# Patient Record
Sex: FEMALE | HISPANIC OR LATINO | URBAN - METROPOLITAN AREA
[De-identification: names, ages, dates, MRNs, and addresses within clinical notes are randomized per-mention and may not be internally consistent; named-entity substitution may affect disease eponyms.]

---

## 2018-04-24 ENCOUNTER — OFFICE (OUTPATIENT)
Dept: URBAN - METROPOLITAN AREA CLINIC 33 | Facility: CLINIC | Age: 63
End: 2018-04-24

## 2018-04-24 VITALS
DIASTOLIC BLOOD PRESSURE: 99 MMHG | SYSTOLIC BLOOD PRESSURE: 136 MMHG | HEIGHT: 66 IN | WEIGHT: 161 LBS | HEART RATE: 78 BPM | TEMPERATURE: 97.2 F

## 2018-04-24 DIAGNOSIS — Z86.010 PERSONAL HISTORY OF COLONIC POLYPS: ICD-10-CM

## 2018-04-24 DIAGNOSIS — R10.13 EPIGASTRIC PAIN: ICD-10-CM

## 2018-04-24 PROCEDURE — 99244 OFF/OP CNSLTJ NEW/EST MOD 40: CPT

## 2018-04-24 NOTE — SERVICEHPINOTES
LISA KAUR   is a   62   year old    female who is being seen in consultation at the request of   VIOLA DENT   for complaints of 1 year of worsening epigastric pain, burning in nature. The pain is frequent but not constant. Severity will range from 0 to 9 at its worst. She notes that the burning comes up a bit into her chest but denies any reflux of contents. She states pain does not get worse with meals nor does it get better with meals. However she has noted a decrease in appetite.  There are no specific triggers. She will wake up with the pain sometimes and if she does she will take 2 tums and symptoms get better. She has been taking ranitidine without much relief. She denies any nausea, vomiting, constipation, diarrhea, change in BMs, melena, rectal bleeding, dysphagia, weight loss. She has had weight gain. She does admit to almost daily NSAID use. She denies any fevers/chills/night sweats, urinary complaints, GYN complaints. She was started on PPI by her PCP. Her last colonoscopy was in 1998 for GI bleeding due to polyps. She had an EGD 20 years ago. She has no family history of colon cancer or gastric cancer.

## 2018-05-08 ENCOUNTER — OFFICE (OUTPATIENT)
Dept: URBAN - METROPOLITAN AREA PATHOLOGY 17 | Facility: PATHOLOGY | Age: 63
End: 2018-05-08
Payer: COMMERCIAL

## 2018-05-08 ENCOUNTER — OFFICE (OUTPATIENT)
Dept: URBAN - METROPOLITAN AREA CLINIC 32 | Facility: CLINIC | Age: 63
End: 2018-05-08
Payer: COMMERCIAL

## 2018-05-08 VITALS
SYSTOLIC BLOOD PRESSURE: 126 MMHG | DIASTOLIC BLOOD PRESSURE: 74 MMHG | HEART RATE: 93 BPM | HEART RATE: 94 BPM | HEART RATE: 99 BPM | OXYGEN SATURATION: 96 % | RESPIRATION RATE: 26 BRPM | DIASTOLIC BLOOD PRESSURE: 77 MMHG | RESPIRATION RATE: 18 BRPM | SYSTOLIC BLOOD PRESSURE: 121 MMHG | RESPIRATION RATE: 11 BRPM | WEIGHT: 161 LBS | SYSTOLIC BLOOD PRESSURE: 158 MMHG | OXYGEN SATURATION: 100 % | TEMPERATURE: 97.6 F | RESPIRATION RATE: 14 BRPM | SYSTOLIC BLOOD PRESSURE: 122 MMHG | DIASTOLIC BLOOD PRESSURE: 73 MMHG | HEART RATE: 87 BPM | TEMPERATURE: 97.9 F | OXYGEN SATURATION: 98 % | SYSTOLIC BLOOD PRESSURE: 145 MMHG | HEIGHT: 66 IN | HEART RATE: 95 BPM | DIASTOLIC BLOOD PRESSURE: 84 MMHG | DIASTOLIC BLOOD PRESSURE: 91 MMHG

## 2018-05-08 DIAGNOSIS — R10.13 EPIGASTRIC PAIN: ICD-10-CM

## 2018-05-08 DIAGNOSIS — K29.60 OTHER GASTRITIS WITHOUT BLEEDING: ICD-10-CM

## 2018-05-08 PROBLEM — R93.3 ABNORMAL FINDINGS ON DX IMAGING OF PRT DIGESTIVE TRACT: Status: ACTIVE | Noted: 2018-05-08

## 2018-05-08 PROBLEM — K44.9 DIAPHRAGMATIC HERNIA WITHOUT OBSTRUCTION OR GANGRENE: Status: ACTIVE | Noted: 2018-05-08

## 2018-05-08 PROCEDURE — 88305 TISSUE EXAM BY PATHOLOGIST: CPT

## 2018-05-08 PROCEDURE — 88313 SPECIAL STAINS GROUP 2: CPT

## 2018-05-08 PROCEDURE — 88342 IMHCHEM/IMCYTCHM 1ST ANTB: CPT

## 2018-05-08 RX ORDER — PANTOPRAZOLE SODIUM 40 MG/1
TABLET, DELAYED RELEASE ORAL
Qty: 30 | Refills: 0 | Status: ACTIVE
Start: 2018-05-08

## 2018-05-08 RX ADMIN — LIDOCAINE HYDROCHLORIDE 40 MG: 20 INJECTION, SOLUTION INFILTRATION; PERINEURAL at 14:05

## 2018-05-08 RX ADMIN — LIDOCAINE HYDROCHLORIDE 60 MG: 20 INJECTION, SOLUTION INFILTRATION; PERINEURAL at 14:05

## 2018-07-03 ENCOUNTER — OFFICE (OUTPATIENT)
Dept: URBAN - METROPOLITAN AREA CLINIC 101 | Facility: CLINIC | Age: 63
End: 2018-07-03

## 2018-07-03 VITALS
WEIGHT: 161 LBS | SYSTOLIC BLOOD PRESSURE: 136 MMHG | TEMPERATURE: 98.1 F | DIASTOLIC BLOOD PRESSURE: 89 MMHG | HEART RATE: 82 BPM | HEIGHT: 66 IN

## 2018-07-03 DIAGNOSIS — K64.5 PERIANAL VENOUS THROMBOSIS: ICD-10-CM

## 2018-07-03 DIAGNOSIS — K29.70 GASTRITIS, UNSPECIFIED, WITHOUT BLEEDING: ICD-10-CM

## 2018-07-03 DIAGNOSIS — Z86.010 PERSONAL HISTORY OF COLONIC POLYPS: ICD-10-CM

## 2018-07-03 DIAGNOSIS — K62.89 OTHER SPECIFIED DISEASES OF ANUS AND RECTUM: ICD-10-CM

## 2018-07-03 PROCEDURE — 99214 OFFICE O/P EST MOD 30 MIN: CPT

## 2018-07-03 RX ORDER — HYDROCORTISONE 25 MG/G
CREAM TOPICAL
Qty: 30 | Refills: 3 | Status: ACTIVE
Start: 2018-07-03

## 2018-07-03 RX ORDER — PANTOPRAZOLE SODIUM 40 MG/1
TABLET, DELAYED RELEASE ORAL
Qty: 30 | Refills: 0 | Status: ACTIVE
Start: 2018-05-08

## 2018-07-03 NOTE — SERVICEHPINOTES
LISA KAUR   is a   62  female who complains of "rectal growth" that is about "pea sized" she noticed yesterday for the first time. She describes rectal pain described as "pressure" with mild pain when she wipes anus. No pain with defecation. She has BM 2-3x/day, BSS type 4. No recent sickness. She reports volunteering at soup kitchen two days ago doing heavy lifting. No h/o prior hemorrhoids. Denies fevers, n/v, abdominal pain, melena, diarrhea, change in bowel habits, blood in stool, weight loss. Rare NSAID use--stop 3 days prior to procedure BRNo cardiovascular or pulmonary disease BRprior colonoscopy  many years agoFONT style="BACKGROUND-COLOR: #ffffcc" visited="true"/FONT

## 2018-07-09 ENCOUNTER — OFFICE (OUTPATIENT)
Dept: URBAN - METROPOLITAN AREA CLINIC 33 | Facility: CLINIC | Age: 63
End: 2018-07-09

## 2018-07-09 PROCEDURE — 00031: CPT

## 2018-07-31 ENCOUNTER — OFFICE (OUTPATIENT)
Dept: URBAN - METROPOLITAN AREA CLINIC 32 | Facility: CLINIC | Age: 63
End: 2018-07-31
Payer: COMMERCIAL

## 2018-07-31 VITALS
RESPIRATION RATE: 16 BRPM | OXYGEN SATURATION: 99 % | OXYGEN SATURATION: 100 % | RESPIRATION RATE: 14 BRPM | DIASTOLIC BLOOD PRESSURE: 51 MMHG | DIASTOLIC BLOOD PRESSURE: 66 MMHG | SYSTOLIC BLOOD PRESSURE: 137 MMHG | HEART RATE: 75 BPM | TEMPERATURE: 97.2 F | HEART RATE: 90 BPM | RESPIRATION RATE: 19 BRPM | DIASTOLIC BLOOD PRESSURE: 56 MMHG | HEART RATE: 82 BPM | DIASTOLIC BLOOD PRESSURE: 105 MMHG | HEIGHT: 66 IN | HEART RATE: 74 BPM | TEMPERATURE: 98.1 F | RESPIRATION RATE: 21 BRPM | SYSTOLIC BLOOD PRESSURE: 95 MMHG | SYSTOLIC BLOOD PRESSURE: 123 MMHG | SYSTOLIC BLOOD PRESSURE: 140 MMHG | DIASTOLIC BLOOD PRESSURE: 83 MMHG | SYSTOLIC BLOOD PRESSURE: 100 MMHG | WEIGHT: 161 LBS | HEART RATE: 79 BPM

## 2018-07-31 DIAGNOSIS — Z86.010 PERSONAL HISTORY OF COLONIC POLYPS: ICD-10-CM

## 2018-07-31 PROBLEM — K64.8 OTHER HEMORRHOIDS: Status: ACTIVE | Noted: 2018-07-31

## 2018-07-31 PROCEDURE — 45378 DIAGNOSTIC COLONOSCOPY: CPT

## 2018-07-31 RX ADMIN — LIDOCAINE HYDROCHLORIDE 40 MG: 20 INJECTION, SOLUTION INFILTRATION; PERINEURAL at 09:06
